# Patient Record
Sex: FEMALE | URBAN - METROPOLITAN AREA
[De-identification: names, ages, dates, MRNs, and addresses within clinical notes are randomized per-mention and may not be internally consistent; named-entity substitution may affect disease eponyms.]

---

## 2021-08-18 ENCOUNTER — APPOINTMENT (RX ONLY)
Dept: URBAN - METROPOLITAN AREA CLINIC 80 | Facility: CLINIC | Age: 46
Setting detail: DERMATOLOGY
End: 2021-08-18

## 2021-08-18 DIAGNOSIS — B36.0 PITYRIASIS VERSICOLOR: ICD-10-CM | Status: INADEQUATELY CONTROLLED

## 2021-08-18 DIAGNOSIS — L81.0 POSTINFLAMMATORY HYPERPIGMENTATION: ICD-10-CM

## 2021-08-18 DIAGNOSIS — L85.3 XEROSIS CUTIS: ICD-10-CM

## 2021-08-18 PROCEDURE — ? COUNSELING

## 2021-08-18 PROCEDURE — 99203 OFFICE O/P NEW LOW 30 MIN: CPT

## 2021-08-18 PROCEDURE — ? PRESCRIPTION

## 2021-08-18 PROCEDURE — ? PRESCRIPTION MEDICATION MANAGEMENT

## 2021-08-18 RX ORDER — KETOCONAZOLE 20 MG/ML
SHAMPOO, SUSPENSION TOPICAL BIW
Qty: 1 | Refills: 2 | Status: ERX | COMMUNITY
Start: 2021-08-18

## 2021-08-18 RX ORDER — FLUCONAZOLE 150 MG/1
TABLET ORAL
Qty: 4 | Refills: 0 | Status: ERX | COMMUNITY
Start: 2021-08-18

## 2021-08-18 RX ADMIN — KETOCONAZOLE: 20 SHAMPOO, SUSPENSION TOPICAL at 00:00

## 2021-08-18 RX ADMIN — FLUCONAZOLE: 150 TABLET ORAL at 00:00

## 2021-08-18 ASSESSMENT — LOCATION ZONE DERM
LOCATION ZONE: ARM
LOCATION ZONE: ARM
LOCATION ZONE: TRUNK
LOCATION ZONE: NECK
LOCATION ZONE: TRUNK

## 2021-08-18 ASSESSMENT — LOCATION DETAILED DESCRIPTION DERM
LOCATION DETAILED: LEFT ANTERIOR SHOULDER
LOCATION DETAILED: LEFT POSTERIOR SHOULDER
LOCATION DETAILED: LEFT SUPERIOR UPPER BACK
LOCATION DETAILED: RIGHT SUPERIOR MEDIAL UPPER BACK
LOCATION DETAILED: STERNAL NOTCH
LOCATION DETAILED: LEFT POSTERIOR SHOULDER
LOCATION DETAILED: RIGHT ANTERIOR SHOULDER
LOCATION DETAILED: RIGHT POSTERIOR SHOULDER
LOCATION DETAILED: LEFT LATERAL TRAPEZIAL NECK
LOCATION DETAILED: RIGHT LATERAL TRAPEZIAL NECK
LOCATION DETAILED: RIGHT SUPERIOR UPPER BACK

## 2021-08-18 ASSESSMENT — LOCATION SIMPLE DESCRIPTION DERM
LOCATION SIMPLE: POSTERIOR NECK
LOCATION SIMPLE: LEFT SHOULDER
LOCATION SIMPLE: RIGHT UPPER BACK
LOCATION SIMPLE: RIGHT SHOULDER
LOCATION SIMPLE: RIGHT UPPER BACK
LOCATION SIMPLE: CHEST
LOCATION SIMPLE: LEFT SHOULDER
LOCATION SIMPLE: RIGHT SHOULDER
LOCATION SIMPLE: LEFT UPPER BACK

## 2021-08-18 NOTE — PROCEDURE: PRESCRIPTION MEDICATION MANAGEMENT
Plan: Has history of using terbinafine. Plan biopsy and bloodwork if not better.
Render In Strict Bullet Format?: No
Detail Level: Zone
Initiate Treatment: ketoconazole 2 % shampoo BIW- Use daily on torso leave on for 3/to 5 minutes x 7 days then stop for 7 days then repeat. After that use 2-3 times weekly\\n\\nDiflucan 150 mg tablet - Take one po then wait 20 minutes. Work out wait to take shower Repeat weekly

## 2021-09-15 ENCOUNTER — APPOINTMENT (RX ONLY)
Dept: URBAN - METROPOLITAN AREA CLINIC 80 | Facility: CLINIC | Age: 46
Setting detail: DERMATOLOGY
End: 2021-09-15

## 2021-09-15 ENCOUNTER — RX ONLY (OUTPATIENT)
Age: 46
Setting detail: RX ONLY
End: 2021-09-15

## 2021-09-15 DIAGNOSIS — L81.0 POSTINFLAMMATORY HYPERPIGMENTATION: ICD-10-CM

## 2021-09-15 DIAGNOSIS — B36.0 PITYRIASIS VERSICOLOR: ICD-10-CM

## 2021-09-15 DIAGNOSIS — L85.3 XEROSIS CUTIS: ICD-10-CM

## 2021-09-15 PROCEDURE — ? PRESCRIPTION MEDICATION MANAGEMENT

## 2021-09-15 PROCEDURE — ? COUNSELING

## 2021-09-15 PROCEDURE — 99214 OFFICE O/P EST MOD 30 MIN: CPT

## 2021-09-15 RX ORDER — FLUCONAZOLE 150 MG/1
TABLET ORAL
Qty: 4 | Refills: 0 | Status: ERX

## 2021-09-15 ASSESSMENT — LOCATION SIMPLE DESCRIPTION DERM
LOCATION SIMPLE: CHEST
LOCATION SIMPLE: LEFT SHOULDER
LOCATION SIMPLE: LEFT SHOULDER
LOCATION SIMPLE: RIGHT SHOULDER
LOCATION SIMPLE: RIGHT UPPER BACK
LOCATION SIMPLE: RIGHT UPPER BACK
LOCATION SIMPLE: LEFT UPPER BACK
LOCATION SIMPLE: RIGHT SHOULDER
LOCATION SIMPLE: POSTERIOR NECK

## 2021-09-15 ASSESSMENT — LOCATION DETAILED DESCRIPTION DERM
LOCATION DETAILED: STERNAL NOTCH
LOCATION DETAILED: RIGHT ANTERIOR SHOULDER
LOCATION DETAILED: LEFT POSTERIOR SHOULDER
LOCATION DETAILED: LEFT ANTERIOR SHOULDER
LOCATION DETAILED: LEFT POSTERIOR SHOULDER
LOCATION DETAILED: RIGHT POSTERIOR SHOULDER
LOCATION DETAILED: RIGHT SUPERIOR MEDIAL UPPER BACK
LOCATION DETAILED: LEFT SUPERIOR UPPER BACK
LOCATION DETAILED: LEFT LATERAL TRAPEZIAL NECK
LOCATION DETAILED: RIGHT LATERAL TRAPEZIAL NECK
LOCATION DETAILED: RIGHT SUPERIOR UPPER BACK

## 2021-09-15 ASSESSMENT — LOCATION ZONE DERM
LOCATION ZONE: TRUNK
LOCATION ZONE: TRUNK
LOCATION ZONE: ARM
LOCATION ZONE: NECK
LOCATION ZONE: ARM

## 2021-09-15 NOTE — PROCEDURE: PRESCRIPTION MEDICATION MANAGEMENT
Plan: Has history of using terbinafine. Plan biopsy and bloodwork if not better.
Render In Strict Bullet Format?: No
Detail Level: Zone
Continue Regimen: ketoconazole 2 % shampoo BIW- Use daily on torso leave on for 3/to 5 minutes x 7 days then stop for 7 days then repeat. After that use 2-3 times weekly\\n\\nDiflucan 150 mg tablet - Take one po then wait 20 minutes. Work out wait to take shower Repeat weekly

## 2021-12-28 ENCOUNTER — RX ONLY (OUTPATIENT)
Age: 46
Setting detail: RX ONLY
End: 2021-12-28

## 2021-12-28 ENCOUNTER — APPOINTMENT (RX ONLY)
Dept: URBAN - METROPOLITAN AREA CLINIC 80 | Facility: CLINIC | Age: 46
Setting detail: DERMATOLOGY
End: 2021-12-28

## 2021-12-28 DIAGNOSIS — B36.0 PITYRIASIS VERSICOLOR: ICD-10-CM | Status: RESOLVED

## 2021-12-28 DIAGNOSIS — I83.9 ASYMPTOMATIC VARICOSE VEINS OF LOWER EXTREMITIES: ICD-10-CM | Status: STABLE

## 2021-12-28 PROBLEM — I83.93 ASYMPTOMATIC VARICOSE VEINS OF BILATERAL LOWER EXTREMITIES: Status: ACTIVE | Noted: 2021-12-28

## 2021-12-28 PROCEDURE — ? COUNSELING

## 2021-12-28 PROCEDURE — 99213 OFFICE O/P EST LOW 20 MIN: CPT

## 2021-12-28 PROCEDURE — ? PRESCRIPTION MEDICATION MANAGEMENT

## 2021-12-28 RX ORDER — KETOCONAZOLE 20 MG/ML
SHAMPOO, SUSPENSION TOPICAL
Qty: 120 | Refills: 2 | Status: ERX

## 2021-12-28 ASSESSMENT — LOCATION SIMPLE DESCRIPTION DERM
LOCATION SIMPLE: RIGHT SHOULDER
LOCATION SIMPLE: RIGHT CALF
LOCATION SIMPLE: LEFT SHOULDER
LOCATION SIMPLE: LEFT CALF
LOCATION SIMPLE: RIGHT UPPER BACK

## 2021-12-28 ASSESSMENT — LOCATION DETAILED DESCRIPTION DERM
LOCATION DETAILED: LEFT POSTERIOR SHOULDER
LOCATION DETAILED: RIGHT SUPERIOR MEDIAL UPPER BACK
LOCATION DETAILED: LEFT PROXIMAL CALF
LOCATION DETAILED: RIGHT PROXIMAL CALF
LOCATION DETAILED: RIGHT POSTERIOR SHOULDER

## 2021-12-28 ASSESSMENT — SEVERITY ASSESSMENT: SEVERITY: ALMOST CLEAR

## 2021-12-28 ASSESSMENT — LOCATION ZONE DERM
LOCATION ZONE: LEG
LOCATION ZONE: TRUNK
LOCATION ZONE: ARM

## 2021-12-28 NOTE — PROCEDURE: PRESCRIPTION MEDICATION MANAGEMENT
Plan: Instructed to watch diet:  avoid foods with high yeast content
Render In Strict Bullet Format?: No
Detail Level: Zone
Modify Regimen: ketoconazole 2 % shampoo once to twice a day to prevent